# Patient Record
Sex: FEMALE | URBAN - METROPOLITAN AREA
[De-identification: names, ages, dates, MRNs, and addresses within clinical notes are randomized per-mention and may not be internally consistent; named-entity substitution may affect disease eponyms.]

---

## 2018-10-11 ENCOUNTER — IMPORTED ENCOUNTER (OUTPATIENT)
Dept: URBAN - METROPOLITAN AREA CLINIC 1 | Facility: CLINIC | Age: 33
End: 2018-10-11

## 2018-10-11 PROBLEM — H16.002: Noted: 2018-10-11

## 2018-10-11 PROCEDURE — 92002 INTRM OPH EXAM NEW PATIENT: CPT

## 2018-10-11 NOTE — PATIENT DISCUSSION
1.  Corneal ulcer OS: Patient does sleep in CTLs. Begin Besivance QID OS (written rx given). Return immediately if ulcer larger or symptoms worsen. No contact lens use. Advised that patient is at higher risk of infection due to extended CL Wear. Strongly recommended patient wear CLs for their FDA Approved amount of time. Return for an appointment in Monday/Tuesday 10 (ulcer recheck) with Dr. Kb Mitchell.

## 2018-10-15 ENCOUNTER — IMPORTED ENCOUNTER (OUTPATIENT)
Dept: URBAN - METROPOLITAN AREA CLINIC 1 | Facility: CLINIC | Age: 33
End: 2018-10-15

## 2018-10-15 PROBLEM — H16.002: Noted: 2018-10-15

## 2018-10-15 PROCEDURE — 92012 INTRM OPH EXAM EST PATIENT: CPT

## 2018-10-15 NOTE — PATIENT DISCUSSION
1. Peripheral Corneal ulcer OS due to CL Overwear/ Abuse- resolved. Safe CL Wear discussed with patient. Use Besivance QID OS through today then ok to d/c. Ok to resume CL wear starting tomorrow. Return for an appointment in Dec 30/cc (may need to switch to 40/cc if patient gets routine ins) with Dr. Bobo Guerrier.

## 2022-04-02 ASSESSMENT — TONOMETRY
OD_IOP_MMHG: 16
OS_IOP_MMHG: 16
OD_IOP_MMHG: 16
OS_IOP_MMHG: 15

## 2022-04-02 ASSESSMENT — VISUAL ACUITY
OS_SC: 20/20-2
OS_SC: 20/25
OD_SC: 20/20
OD_SC: 20/20